# Patient Record
Sex: MALE | Race: WHITE | NOT HISPANIC OR LATINO | Employment: STUDENT | URBAN - METROPOLITAN AREA
[De-identification: names, ages, dates, MRNs, and addresses within clinical notes are randomized per-mention and may not be internally consistent; named-entity substitution may affect disease eponyms.]

---

## 2017-04-06 ENCOUNTER — OFFICE VISIT (OUTPATIENT)
Dept: FAMILY MEDICINE | Facility: CLINIC | Age: 27
End: 2017-04-06
Payer: COMMERCIAL

## 2017-04-06 VITALS
HEIGHT: 75 IN | TEMPERATURE: 97.5 F | BODY MASS INDEX: 26.58 KG/M2 | DIASTOLIC BLOOD PRESSURE: 82 MMHG | RESPIRATION RATE: 18 BRPM | SYSTOLIC BLOOD PRESSURE: 132 MMHG | HEART RATE: 65 BPM | OXYGEN SATURATION: 98 % | WEIGHT: 213.8 LBS

## 2017-04-06 DIAGNOSIS — Z11.3 SCREEN FOR STD (SEXUALLY TRANSMITTED DISEASE): Primary | ICD-10-CM

## 2017-04-06 DIAGNOSIS — Z23 NEED FOR HPV VACCINATION: ICD-10-CM

## 2017-04-06 DIAGNOSIS — Z00.00 ROUTINE GENERAL MEDICAL EXAMINATION AT A HEALTH CARE FACILITY: ICD-10-CM

## 2017-04-06 LAB
CHOLEST SERPL-MCNC: 170 MG/DL
HBV SURFACE AB SERPL IA-ACNC: 0.17 M[IU]/ML
HBV SURFACE AG SERPL QL IA: NONREACTIVE
HCV AB SERPL QL IA: NORMAL
HDLC SERPL-MCNC: 53 MG/DL
HIV 1+2 AB+HIV1 P24 AG SERPL QL IA: NORMAL
LDLC SERPL CALC-MCNC: 95 MG/DL
NONHDLC SERPL-MCNC: 117 MG/DL
T PALLIDUM IGG+IGM SER QL: NEGATIVE
TRIGL SERPL-MCNC: 108 MG/DL

## 2017-04-06 PROCEDURE — 90471 IMMUNIZATION ADMIN: CPT | Performed by: FAMILY MEDICINE

## 2017-04-06 PROCEDURE — 86780 TREPONEMA PALLIDUM: CPT | Performed by: FAMILY MEDICINE

## 2017-04-06 PROCEDURE — 80061 LIPID PANEL: CPT | Performed by: FAMILY MEDICINE

## 2017-04-06 PROCEDURE — 87491 CHLMYD TRACH DNA AMP PROBE: CPT | Performed by: FAMILY MEDICINE

## 2017-04-06 PROCEDURE — 87340 HEPATITIS B SURFACE AG IA: CPT | Performed by: FAMILY MEDICINE

## 2017-04-06 PROCEDURE — 86803 HEPATITIS C AB TEST: CPT | Performed by: FAMILY MEDICINE

## 2017-04-06 PROCEDURE — 87591 N.GONORRHOEAE DNA AMP PROB: CPT | Performed by: FAMILY MEDICINE

## 2017-04-06 PROCEDURE — 90651 9VHPV VACCINE 2/3 DOSE IM: CPT | Performed by: FAMILY MEDICINE

## 2017-04-06 PROCEDURE — 87389 HIV-1 AG W/HIV-1&-2 AB AG IA: CPT | Performed by: FAMILY MEDICINE

## 2017-04-06 PROCEDURE — 36415 COLL VENOUS BLD VENIPUNCTURE: CPT | Performed by: FAMILY MEDICINE

## 2017-04-06 PROCEDURE — 99395 PREV VISIT EST AGE 18-39: CPT | Mod: 25 | Performed by: FAMILY MEDICINE

## 2017-04-06 PROCEDURE — 86706 HEP B SURFACE ANTIBODY: CPT | Performed by: FAMILY MEDICINE

## 2017-04-06 NOTE — NURSING NOTE
"Chief Complaint   Patient presents with     Physical     STD check       Initial /82  Pulse 65  Temp 97.5  F (36.4  C) (Oral)  Resp 18  Ht 6' 3\" (1.905 m)  Wt 213 lb 12.8 oz (97 kg)  SpO2 98%  BMI 26.72 kg/m2 Estimated body mass index is 26.72 kg/(m^2) as calculated from the following:    Height as of this encounter: 6' 3\" (1.905 m).    Weight as of this encounter: 213 lb 12.8 oz (97 kg).  Medication Reconciliation: complete     Laurel Dhaliwal MA    "

## 2017-04-06 NOTE — LETTER
[facility]   [address], [place]  [address]  [phone]      April 10, 2017      Bry IZQUIERDO Andrew  [address] [facility] [address]              Dear Corina Morales,    All of your blood test results are normal, including normal cholesterol and negative STD screening for gonorrhea, chlamydia, HIV, syphilis, Hepatitis B and Hepatitis C.    Results for orders placed or performed in visit on 04/06/17   Lipid Profile   Result Value Ref Range    Cholesterol 170 <200 mg/dL    Triglycerides 108 <150 mg/dL    HDL Cholesterol 53 >39 mg/dL    LDL Cholesterol Calculated 95 <100 mg/dL    Non HDL Cholesterol 117 <130 mg/dL   Hepatitis B Surface Antibody   Result Value Ref Range    Hepatitis B Surface Antibody 0.17 <8.00 m[IU]/mL   Hepatitis B surface antigen   Result Value Ref Range    Hep B Surface Agn Nonreactive NR   Hepatitis C antibody   Result Value Ref Range    Hepatitis C Antibody  NR     Nonreactive   Assay performance characteristics have not been established for newborns,   infants, and children     HIV Antigen Antibody Combo   Result Value Ref Range    HIV Antigen Antibody Combo  NR     Nonreactive   HIV-1 p24 Ag & HIV-1/HIV-2 Ab Not Detected     Anti Treponema   Result Value Ref Range    Treponema pallidum Antibody Negative NEG   NEISSERIA GONORRHOEA PCR   Result Value Ref Range    Specimen Descrip Urine     N Gonorrhea PCR  NEG     Negative   Negative for N. gonorrhoeae rRNA by transcription mediated amplification.   A negative result by transcription mediated amplification does not preclude the   presence of N. gonorrhoeae infection because results are dependent on proper   and adequate collection, absence of inhibitors, and sufficient rRNA to be   detected.     CHLAMYDIA TRACHOMATIS PCR   Result Value Ref Range    Specimen Description Urine     Chlamydia Trachomatis PCR  NEG     Negative   Negative for C. trachomatis rRNA by transcription mediated amplification.   A negative  result by transcription mediated amplification does not preclude the   presence of C. trachomatis infection because results are dependent on proper   and adequate collection, absence of inhibitors, and sufficient rRNA to be   detected.             Sincerely,    Lorrie Petersen MD/nr

## 2017-04-06 NOTE — MR AVS SNAPSHOT
After Visit Summary   4/6/2017    Bry Morales    MRN: [id number]           Patient Information     Date Of Birth          1990        Visit Information        Provider Department      4/6/2017 8:00 AM Lorrie Petersen MD [facility]        Today's Diagnoses     Screen for STD (sexually transmitted disease)    -  1    Routine general medical examination at a health care facility        Need for HPV vaccination          Care Instructions      Preventive Health Recommendations  Male Ages 18 - 25     Yearly exam:             See your health care provider every year in order to  o   Review health changes.   o   Discuss preventive care.    o   Review your medicines if your doctor has prescribed any.    You should be tested each year for STDs (sexually transmitted diseases).     Talk to your provider about cholesterol testing.      If you are at risk for diabetes, you should have a diabetes test (fasting glucose).    Shots: Get a flu shot each year. Get a tetanus shot every 10 years.     Nutrition:    Eat at least 5 servings of fruits and vegetables daily.     Eat whole-grain bread, whole-wheat pasta and brown rice instead of white grains and rice.     Talk to your provider about calcium and Vitamin D.     Lifestyle    Exercise for at least 150 minutes a week (30 minutes a day, 5 days a week). This will help you control your weight and prevent disease.     Limit alcohol to one drink per day.     No smoking.     Wear sunscreen to prevent skin cancer.     See your dentist every six months for an exam and cleaning.           Follow-ups after your visit        Who to contact     If you have questions or need follow up information about today's clinic visit or your schedule please contact [facility] directly at [phone].  Normal or non-critical lab and imaging results will be communicated to you by MyChart, letter or phone within 4 business days after the clinic  "has received the results. If you do not hear from us within 7 days, please contact the clinic through [employer] or phone. If you have a critical or abnormal lab result, we will notify you by phone as soon as possible.  Submit refill requests through [employer] or call your pharmacy and they will forward the refill request to us. Please allow 3 business days for your refill to be completed.          Additional Information About Your Visit        [employer] Information     [employer] lets you send messages to your doctor, view your test results, renew your prescriptions, schedule appointments and more. To sign up, go to [email or web address] . Click on \"Log in\" on the left side of the screen, which will take you to the Welcome page. Then click on \"Sign up Now\" on the right side of the page.     You will be asked to enter the access code listed below, as well as some personal information. Please follow the directions to create your username and password.     Your access code is: [email or web address]  Expires: 2017  5:22 PM     Your access code will  in 90 days. If you need help or a new code, please call your [facility] clinic or [phone].        Care EveryWhere ID     This is your Care EveryWhere ID. This could be used by other organizations to access your [facility] medical records  [id number]        Your Vitals Were     Pulse Temperature Respirations Height Pulse Oximetry BMI (Body Mass Index)    65 97.5  F (36.4  C) (Oral) 18 6' 3\" (1.905 m) 98% 26.72 kg/m2       Blood Pressure from Last 3 Encounters:   17 132/82   14 112/70    Weight from Last 3 Encounters:   17 213 lb 12.8 oz (97 kg)   14 181 lb (82.1 kg)   01/16/10 175 lb (79.4 kg) (80 %)*     * Growth percentiles are based on CDC 2-20 Years data.              We Performed the Following     ADMIN 1st VACCINE     Anti Treponema     CHLAMYDIA TRACHOMATIS PCR     Hepatitis B Surface Antibody     Hepatitis B surface antigen     Hepatitis C " antibody     HIV Antigen Antibody Combo     HUMAN PAPILLOMA VIRUS (GARDASIL 9) VACCINE     Lipid Profile     NEISSERIA GONORRHOEA PCR        Primary Care Provider Office Phone # Fax #    TIFFANY [facility] [phone]       [facility] [address]  [facility] [address]        Thank you!     Thank you for choosing [facility]  for your care. Our goal is always to provide you with excellent care. Hearing back from our patients is one way we can continue to improve our services. Please take a few minutes to complete the written survey that you may receive in the mail after your visit with us. Thank you!             Your Updated Medication List - Protect others around you: Learn how to safely use, store and throw away your medicines at www.disposemymeds.org.      Notice  As of 4/6/2017 11:59 PM    You have not been prescribed any medications.

## 2017-04-06 NOTE — PROGRESS NOTES
SUBJECTIVE:     CC: Bry Morales is an 26 year old male who presents for preventative health visit.     Physical   Annual:     Getting at least 3 servings of Calcium per day::  Yes    Bi-annual eye exam::  NO    Dental care twice a year::  Yes    Sleep apnea or symptoms of sleep apnea::  Daytime drowsiness    Diet::  Regular (no restrictions)    Frequency of exercise::  2-3 days/week    Duration of exercise::  30-45 minutes    Taking medications regularly::  Not Applicable    Additional concerns today::  No    STD screening  Unprotected sex with 2 people; one is long-term girlfriend, the other was another partner 2.5 years ago.  This other partner engaged in risky behaviors.  No IVDU for himself or partners to his knowledge.    CV: CAD in MGF at age 55, otherwise no early CAD. He rarely smokes 1-2 cigs.  Malignancy: his mother has had breast cancer, is doing well. His maternal aunt has also had breast cancer, and maternal grandmother.  No prostate or colon cancer in the family.   Bone health: no risk factors other than rare tobacco  Immunizations: tdap done 2014.  Has not had HPV series.  Sexual health: as above.  No symptoms such as dysuria, discharge or sores.  Depression screen: neg        Today's PHQ-2 Score:   PHQ-2 ( 1999 Pfizer) 4/6/2017   Q1: Little interest or pleasure in doing things -   Q2: Feeling down, depressed or hopeless -   PHQ-2 Score -   Little interest or pleasure in doing things Not at all   Feeling down, depressed or hopeless Not at all   PHQ-2 Score 0       Abuse: Current or Past(Physical, Sexual or Emotional)- No  Do you feel safe in your environment - Yes    Social History   Substance Use Topics     Smoking status: Current Some Day Smoker     Types: Cigarettes     Smokeless tobacco: Not on file      Comment: Social smoker      Alcohol use Yes     The patient does not drink >3 drinks per day nor >7 drinks per week.    Last PSA: No results found for: PSA    No results for input(s): CHOL, HDL,  "LDL, TRIG, CHOLHDLRATIO, NHDL in the last 21988 hours.    Reviewed orders with patient. Reviewed health maintenance and updated orders accordingly - Yes    Reviewed and updated as needed this visit by clinical staff         Reviewed and updated as needed this visit by Provider            ROS:  C: NEGATIVE for fever, chills, change in weight  I: NEGATIVE for worrisome rashes, moles or lesions  E: NEGATIVE for vision changes or irritation  ENT: NEGATIVE for ear, mouth and throat problems  R: NEGATIVE for significant cough or SOB  CV: NEGATIVE for chest pain, palpitations or peripheral edema  GI: NEGATIVE for nausea, abdominal pain, heartburn, or change in bowel habits   male: negative for dysuria, hematuria, decreased urinary stream, erectile dysfunction, urethral discharge  M: NEGATIVE for significant arthralgias or myalgia  N: NEGATIVE for weakness, dizziness or paresthesias  P: NEGATIVE for changes in mood or affect    Patient Active Problem List   Diagnosis     Hyperlipidemia LDL goal <160     History reviewed. No pertinent surgical history.    Social History   Substance Use Topics     Smoking status: Current Some Day Smoker     Types: Cigarettes     Smokeless tobacco: Not on file      Comment: Social smoker      Alcohol use Yes      Comment: 5-10 weekend     Family History   Problem Relation Age of Onset     Breast Cancer Mother      Family History Negative Father      HEART DISEASE Maternal Grandfather 55     Breast Cancer Maternal Grandmother      DIABETES Maternal Grandmother      Family History Negative Paternal Grandmother      Breast Cancer Maternal Aunt      DIABETES Maternal Uncle          No current outpatient prescriptions on file.     No Known Allergies  OBJECTIVE:     /82  Pulse 65  Temp 97.5  F (36.4  C) (Oral)  Resp 18  Ht 6' 3\" (1.905 m)  Wt 213 lb 12.8 oz (97 kg)  SpO2 98%  BMI 26.72 kg/m2  EXAM:  GENERAL: healthy, alert and no distress  EYES: Eyes grossly normal to inspection, " PERRL and conjunctivae and sclerae normal  HENT: ear canals and TM's normal, nose and mouth without ulcers or lesions  NECK: no adenopathy, no asymmetry, masses, or scars and thyroid normal to palpation  RESP: lungs clear to auscultation - no rales, rhonchi or wheezes  CV: regular rate and rhythm, normal S1 S2, no S3 or S4, no murmur, click or rub, no peripheral edema and peripheral pulses strong  ABDOMEN: soft, nontender, no hepatosplenomegaly, no masses and bowel sounds normal   (male): normal male genitalia without lesions or urethral discharge, no hernia  MS: no gross musculoskeletal defects noted, no edema  SKIN: no suspicious lesions or rashes  NEURO: Normal strength and tone, mentation intact and speech normal  PSYCH: mentation appears normal, affect normal/bright    ASSESSMENT/PLAN:     1. Screen for STD (sexually transmitted disease)    - Hepatitis B Surface Antibody  - Hepatitis B surface antigen  - Hepatitis C antibody  - HIV Antigen Antibody Combo  - Anti Treponema  - NEISSERIA GONORRHOEA PCR  - CHLAMYDIA TRACHOMATIS PCR    2. Routine general medical examination at a health care facility  CV: he'd like to check his cholesterol.  Some family h/o early CAD so will get a baseline.  If elevated would recommend lifestyle changes such as eliminating tobacco altogether and decreasing EtOH (which is 5-10 drinks on the weekends).  He is prehypertensive and overweight.  Malignancy: reviewed screening recs.   Bone health: recommend moderation of etoh, no smoking  Sexual health: as above, recommended condom use    - Lipid Profile    3. Need for HPV vaccination    - HUMAN PAPILLOMA VIRUS (GARDASIL 9) VACCINE  - ADMIN 1st VACCINE    COUNSELING:   Reviewed preventive health counseling, as reflected in patient instructions       Regular exercise       Healthy diet/nutrition       Family planning       Safe sex practices/STD prevention       HIV screeninx in teen years, 1x in adult years, and at intervals if high  "risk         reports that he has been smoking Cigarettes.  He does not have any smokeless tobacco history on file.    Estimated body mass index is 22.47 kg/(m^2) as calculated from the following:    Height as of 5/20/14: 6' 3.25\" (1.911 m).    Weight as of 5/20/14: 181 lb (82.1 kg).       Counseling Resources:  ATP IV Guidelines  Pooled Cohorts Equation Calculator  FRAX Risk Assessment  ICSI Preventive Guidelines  Dietary Guidelines for Americans, 2010  USDA's MyPlate  ASA Prophylaxis  Lung CA Screening    Lorrie Petersen MD  [facility]  Answers for HPI/ROS submitted by the patient on 4/6/2017   Q1: Little interest or pleasure in doing things: 0=Not at all  Q2: Feeling down, depressed or hopeless: 0=Not at all  PHQ-2 Score: 0    "

## 2017-04-06 NOTE — NURSING NOTE
Screening Questionnaire for Adult Immunization    Are you sick today?   No   Do you have allergies to medications, food, a vaccine component or latex?   No   Have you ever had a serious reaction after receiving a vaccination?   No   Do you have a long-term health problem with heart disease, lung disease, asthma, kidney disease, metabolic disease (e.g. diabetes), anemia, or other blood disorder?   No   Do you have cancer, leukemia, HIV/AIDS, or any other immune system problem?   No   In the past 3 months, have you taken medications that affect  your immune system, such as prednisone, other steroids, or anticancer drugs; drugs for the treatment of rheumatoid arthritis, Crohn s disease, or psoriasis; or have you had radiation treatments?   No   Have you had a seizure, or a brain or other nervous system problem?   No   During the past year, have you received a transfusion of blood or blood     products, or been given immune (gamma) globulin or antiviral drug?   No   For women: Are you pregnant or is there a chance you could become        pregnant during the next month?   No   Have you received any vaccinations in the past 4 weeks?   No     Immunization questionnaire answers were all negative.      MNVFC doesn't apply on this patient    Per orders of Dr. Petersen, injection of HPV given by Ethan Caruso. Patient instructed to remain in clinic for 20 minutes afterwards, and to report any adverse reaction to me immediately.  Ethan Caruso, CMA

## 2017-04-07 LAB
C TRACH DNA SPEC QL NAA+PROBE: NORMAL
N GONORRHOEA DNA SPEC QL NAA+PROBE: NORMAL
SPECIMEN SOURCE: NORMAL
SPECIMEN SOURCE: NORMAL